# Patient Record
Sex: MALE | Race: BLACK OR AFRICAN AMERICAN | NOT HISPANIC OR LATINO | Employment: FULL TIME | URBAN - METROPOLITAN AREA
[De-identification: names, ages, dates, MRNs, and addresses within clinical notes are randomized per-mention and may not be internally consistent; named-entity substitution may affect disease eponyms.]

---

## 2020-06-09 ENCOUNTER — HOSPITAL ENCOUNTER (EMERGENCY)
Facility: HOSPITAL | Age: 55
Discharge: HOME | End: 2020-06-09
Attending: EMERGENCY MEDICINE
Payer: MEDICAID

## 2020-06-09 VITALS
OXYGEN SATURATION: 99 % | SYSTOLIC BLOOD PRESSURE: 130 MMHG | DIASTOLIC BLOOD PRESSURE: 69 MMHG | BODY MASS INDEX: 34.8 KG/M2 | WEIGHT: 235 LBS | TEMPERATURE: 98 F | HEART RATE: 59 BPM | RESPIRATION RATE: 14 BRPM | HEIGHT: 69 IN

## 2020-06-09 DIAGNOSIS — R10.9 FLANK PAIN: Primary | ICD-10-CM

## 2020-06-09 LAB
ALBUMIN SERPL-MCNC: 3.8 G/DL (ref 3.3–5.5)
ALP SERPL-CCNC: 56 U/L (ref 42–141)
BILIRUB SERPL-MCNC: 0.6 MG/DL (ref 0.2–1.6)
BILIRUBIN, POC UA: NEGATIVE
BLOOD, POC UA: NEGATIVE
BUN SERPL-MCNC: 22 MG/DL (ref 7–22)
CALCIUM SERPL-MCNC: 10 MG/DL (ref 8–10.3)
CHLORIDE SERPL-SCNC: 107 MMOL/L (ref 98–108)
CLARITY, POC UA: CLEAR
COLOR, POC UA: YELLOW
CREAT SERPL-MCNC: 1.3 MG/DL (ref 0.6–1.2)
GLUCOSE SERPL-MCNC: 99 MG/DL (ref 73–118)
GLUCOSE, POC UA: NEGATIVE
KETONES, POC UA: NEGATIVE
LEUKOCYTE EST, POC UA: NEGATIVE
NITRITE, POC UA: NEGATIVE
PH UR STRIP: 5.5 [PH]
POC ALT (SGPT): 17 U/L (ref 10–47)
POC AST (SGOT): 29 U/L (ref 11–38)
POC TCO2: 26 MMOL/L (ref 18–33)
POTASSIUM BLD-SCNC: 3.9 MMOL/L (ref 3.6–5.1)
PROTEIN, POC UA: NEGATIVE
PROTEIN, POC: 7.7 G/DL (ref 6.4–8.1)
SODIUM BLD-SCNC: 145 MMOL/L (ref 128–145)
SPECIFIC GRAVITY, POC UA: >=1.03
UROBILINOGEN, POC UA: 0.2 E.U./DL

## 2020-06-09 PROCEDURE — 96374 THER/PROPH/DIAG INJ IV PUSH: CPT | Mod: ER

## 2020-06-09 PROCEDURE — 63600175 PHARM REV CODE 636 W HCPCS: Mod: ER | Performed by: PHYSICIAN ASSISTANT

## 2020-06-09 PROCEDURE — 80053 COMPREHEN METABOLIC PANEL: CPT | Mod: ER

## 2020-06-09 PROCEDURE — 99284 EMERGENCY DEPT VISIT MOD MDM: CPT | Mod: 25,ER

## 2020-06-09 PROCEDURE — 81003 URINALYSIS AUTO W/O SCOPE: CPT | Mod: ER

## 2020-06-09 PROCEDURE — 85025 COMPLETE CBC W/AUTO DIFF WBC: CPT | Mod: ER

## 2020-06-09 RX ORDER — METHOCARBAMOL 500 MG/1
500 TABLET, FILM COATED ORAL 3 TIMES DAILY
Qty: 15 TABLET | Refills: 0 | Status: SHIPPED | OUTPATIENT
Start: 2020-06-09 | End: 2020-06-14

## 2020-06-09 RX ORDER — DEXTROMETHORPHAN HYDROBROMIDE, GUAIFENESIN 5; 100 MG/5ML; MG/5ML
650 LIQUID ORAL EVERY 8 HOURS PRN
Qty: 30 TABLET | Refills: 0 | Status: SHIPPED | OUTPATIENT
Start: 2020-06-09

## 2020-06-09 RX ORDER — KETOROLAC TROMETHAMINE 30 MG/ML
15 INJECTION, SOLUTION INTRAMUSCULAR; INTRAVENOUS
Status: COMPLETED | OUTPATIENT
Start: 2020-06-09 | End: 2020-06-09

## 2020-06-09 RX ADMIN — KETOROLAC TROMETHAMINE 15 MG: 30 INJECTION, SOLUTION INTRAMUSCULAR at 07:06

## 2020-06-10 NOTE — ED PROVIDER NOTES
Encounter Date: 6/9/2020    SCRIBE #1 NOTE: I, Shamar Cordon, am scribing for, and in the presence of,  MITCH Escobar. I have scribed the following portions of the note - Other sections scribed: HPI, ROS, PE.       History     Chief Complaint   Patient presents with    Flank Pain     R flank pain x 2 wks, getting worse, sharp, radiates to R side abd, denies N/V/D, worse w/ lying down, denies dysuria/hematuria     55 year old male with constant sharp right sided flank pain onset 1 month. Pain has gradually worsened since for 2 weeks and has not gotten better since. Pain radiates to lower right side of abdomen and towards his back. Exacerbated when laying down, but states pain eases up upon standing. Denies any dysuria, hematuria, fevers, vomiting, rashes, or numbness/weakness to lower extremities. Patient has not done any heavy lifting recently. Feels pain currently in the ED. Patient is not on blood thinners. Has not taken any medication for the pain. No known drug allergies.    The history is provided by the patient. No  was used.     Review of patient's allergies indicates:  No Known Allergies  Past Medical History:   Diagnosis Date    Gouty arthropathy, unspecified     Morbid obesity      No past surgical history on file.  Family History   Problem Relation Age of Onset    Hypertension Father     Hyperlipidemia Father      Social History     Tobacco Use    Smoking status: Current Some Day Smoker    Tobacco comment: cigars only   Substance Use Topics    Alcohol use: Yes     Comment: socially    Drug use: Not on file     Review of Systems   Constitutional: Negative for fever.   Gastrointestinal: Positive for abdominal pain. Negative for diarrhea, nausea and vomiting.   Genitourinary: Positive for flank pain. Negative for dysuria and hematuria.   Musculoskeletal: Positive for back pain. Negative for arthralgias.   Skin: Negative for rash.   Neurological: Negative for weakness and  numbness.   All other systems reviewed and are negative.      Physical Exam     Initial Vitals [06/09/20 1907]   BP Pulse Resp Temp SpO2   123/68 (!) 53 18 98.1 °F (36.7 °C) --      MAP       --         Physical Exam    Nursing note and vitals reviewed.  Constitutional: He appears well-developed and well-nourished. He is not diaphoretic. No distress.   HENT:   Head: Normocephalic and atraumatic.   Right Ear: External ear normal.   Left Ear: External ear normal.   Nose: Nose normal.   Eyes: Conjunctivae and EOM are normal. Pupils are equal, round, and reactive to light. No scleral icterus.   Neck: Normal range of motion. Neck supple.   Cardiovascular: Normal rate and regular rhythm.   Pulmonary/Chest: No respiratory distress.   Abdominal: Soft. He exhibits no distension. There is no tenderness. There is no rebound, no guarding and no CVA tenderness.   Musculoskeletal: Normal range of motion.   Neurological: He is alert and oriented to person, place, and time. GCS score is 15. GCS eye subscore is 4. GCS verbal subscore is 5. GCS motor subscore is 6.   Skin: Skin is warm and dry. No rash noted. No erythema.         ED Course   Procedures  Labs Reviewed   POCT URINALYSIS W/O SCOPE - Abnormal; Notable for the following components:       Result Value    Spec Grav UA >=1.030 (*)     All other components within normal limits   POCT CMP - Abnormal; Notable for the following components:    POC Creatinine 1.3 (*)     All other components within normal limits   POCT CBC   POCT URINALYSIS DIPSTICK (CULTURE IF INDICATED)   POCT CMP          Imaging Results          CT Renal Stone Study ABD Pelvis WO (Final result)  Result time 06/09/20 19:51:44    Final result by Bhavana Woodward MD (06/09/20 19:51:44)                 Impression:      1. Hepatomegaly  2. Findings suggesting previous gastric surgery  3. Mild degenerative changes of the spine and atherosclerotic plaque of the aorta and iliac arteries.      Electronically signed  by: Bhavana Woodward MD  Date:    06/09/2020  Time:    19:51             Narrative:    EXAMINATION:  CT RENAL STONE STUDY ABD PELVIS WO    CLINICAL HISTORY:  Flank pain, stone disease suspected;right flank;    TECHNIQUE:  Routine axial CT images of the abdomen and pelvis were obtained without contrast, which decreases sensitivity for detection of lesions of the solid organs, vascular structures, and bowel.   Coronal and Sagittal reformatted images were also obtained.    COMPARISON:  None    FINDINGS:  Heart: Normal in Size.  No pericardial effusion.    Lungs: Well aerated, without consolidation or pleural effusion.    Liver: Enlarged with a maximum sagittal diameter of 18.7 cm.  No focal lesions.    Gallbladder: No calcified gallstones.    Bile ducts: No evidence of dilated ducts.    Pancreas: No mass or peripancreatic fat stranding.    Spleen: Normal.    Adrenals: Normal.    GI Tract/ Mesentery: No evidence of bowel obstruction or inflammation. There is a curvilinear calcific density at the periphery of the stomach, suggesting previous gastric surgery.  The appendix is unremarkable.    Kidneys/ Ureters: Normal in size and location. No hydronephrosis or nephrolithiasis. No ureteral dilatation.    Bladder: No evidence of wall thickening.    Reproductive organs: Normal.    Retroperitoneum: No significant adenopathy.    Peritoneal space: No ascites. No free air.    Abdominal wall: Normal.    Vasculature: Minimal atherosclerotic plaque of the iliac arteries and aorta.  No aortic aneurysm.    Bones: No acute fracture. Minimal degenerative changes of the spine.                              X-Rays:   Independently Interpreted Readings:   Abdomen:   Renal Stone Study - No kidney stone or obstructive uropathy.  Appendix appears normal.     Medical Decision Making:   Clinical Tests:   Lab Tests: Ordered and Reviewed  Radiological Study: Ordered and Reviewed  ED Management:  55-year-old male with right flank pain x2 months.   No dysuria, fever, or vomiting.  No rash on exam.  He is nontender to the abdomen and flank.  UA without evidence of infection.  Labs without concerning abnormality.  CT without evidence of stone or other acute process.  Will treat with Tylenol and muscle relaxer and have patient follow up with PCP.  Resources were provided.            Scribe Attestation:   Scribe #1: I performed the above scribed service and the documentation accurately describes the services I performed. I attest to the accuracy of the note.    Jose Tapia                      Clinical Impression:     1. Flank pain                ED Disposition Condition    Discharge Stable        ED Prescriptions     Medication Sig Dispense Start Date End Date Auth. Provider    acetaminophen (TYLENOL) 650 MG TbSR Take 1 tablet (650 mg total) by mouth every 8 (eight) hours as needed. 30 tablet 6/9/2020  Jose Tapia PA-C    methocarbamoL (ROBAXIN) 500 MG Tab Take 1 tablet (500 mg total) by mouth 3 (three) times daily. for 5 days 15 tablet 6/9/2020 6/14/2020 Jose Tapia PA-C        Follow-up Information     Follow up With Specialties Details Why Contact Info    [facility] Emergency Medicine Go to  If symptoms worsen [address] [facility] [id number] [phone]    [facility]   To establish care [address] [facility] [address]  [phone]      [facility]  Schedule an appointment as soon as possible for a visit  To establish care [address] [facility] [id number] [phone]                                     Jose Tapia PA-C  06/09/20 2016

## 2023-07-08 ENCOUNTER — HOSPITAL ENCOUNTER (EMERGENCY)
Facility: HOSPITAL | Age: 58
Discharge: HOME | End: 2023-07-08
Attending: EMERGENCY MEDICINE
Payer: COMMERCIAL

## 2023-07-08 VITALS
TEMPERATURE: 98 F | RESPIRATION RATE: 18 BRPM | BODY MASS INDEX: 35.5 KG/M2 | HEIGHT: 70 IN | DIASTOLIC BLOOD PRESSURE: 70 MMHG | HEART RATE: 58 BPM | WEIGHT: 248 LBS | OXYGEN SATURATION: 98 % | SYSTOLIC BLOOD PRESSURE: 145 MMHG

## 2023-07-08 DIAGNOSIS — E86.0 DEHYDRATION: ICD-10-CM

## 2023-07-08 DIAGNOSIS — R42 DIZZINESS: ICD-10-CM

## 2023-07-08 DIAGNOSIS — R55 NEAR SYNCOPE: Primary | ICD-10-CM

## 2023-07-08 DIAGNOSIS — R00.1 BRADYCARDIA: ICD-10-CM

## 2023-07-08 LAB
ALBUMIN SERPL-MCNC: 3.6 G/DL (ref 3.3–5.5)
ALP SERPL-CCNC: 53 U/L (ref 42–141)
BILIRUB SERPL-MCNC: 0.5 MG/DL (ref 0.2–1.6)
BILIRUBIN, POC UA: NEGATIVE
BLOOD, POC UA: NEGATIVE
BUN SERPL-MCNC: 18 MG/DL (ref 7–22)
CALCIUM SERPL-MCNC: 9.7 MG/DL (ref 8–10.3)
CHLORIDE SERPL-SCNC: 109 MMOL/L (ref 98–108)
CLARITY, POC UA: CLEAR
COLOR, POC UA: YELLOW
CREAT SERPL-MCNC: 1.1 MG/DL (ref 0.6–1.2)
GLUCOSE SERPL-MCNC: 92 MG/DL (ref 73–118)
GLUCOSE, POC UA: NEGATIVE
KETONES, POC UA: NEGATIVE
LEUKOCYTE EST, POC UA: NEGATIVE
NITRITE, POC UA: NEGATIVE
PH UR STRIP: 5.5 [PH]
POC ALT (SGPT): 22 U/L (ref 10–47)
POC AST (SGOT): 25 U/L (ref 11–38)
POC B-TYPE NATRIURETIC PEPTIDE: 26 PG/ML (ref 0–100)
POC CARDIAC TROPONIN I: 0.01 NG/ML (ref 0–0.08)
POC TCO2: 28 MMOL/L (ref 18–33)
POCT GLUCOSE: 93 MG/DL (ref 70–110)
POTASSIUM BLD-SCNC: 4.3 MMOL/L (ref 3.6–5.1)
PROTEIN, POC UA: NEGATIVE
PROTEIN, POC: 6.7 G/DL (ref 6.4–8.1)
SAMPLE: NORMAL
SODIUM BLD-SCNC: 142 MMOL/L (ref 128–145)
SPECIFIC GRAVITY, POC UA: >=1.03
UROBILINOGEN, POC UA: 0.2 E.U./DL

## 2023-07-08 PROCEDURE — 93010 ELECTROCARDIOGRAM REPORT: CPT | Mod: ,,, | Performed by: INTERNAL MEDICINE

## 2023-07-08 PROCEDURE — 83880 ASSAY OF NATRIURETIC PEPTIDE: CPT | Mod: ER

## 2023-07-08 PROCEDURE — 96360 HYDRATION IV INFUSION INIT: CPT | Mod: ER

## 2023-07-08 PROCEDURE — 96361 HYDRATE IV INFUSION ADD-ON: CPT | Mod: ER

## 2023-07-08 PROCEDURE — 93010 EKG 12-LEAD: ICD-10-PCS | Mod: ,,, | Performed by: INTERNAL MEDICINE

## 2023-07-08 PROCEDURE — 93005 ELECTROCARDIOGRAM TRACING: CPT | Mod: ER

## 2023-07-08 PROCEDURE — 80053 COMPREHEN METABOLIC PANEL: CPT | Mod: ER

## 2023-07-08 PROCEDURE — 99284 EMERGENCY DEPT VISIT MOD MDM: CPT | Mod: 25,ER

## 2023-07-08 PROCEDURE — 85025 COMPLETE CBC W/AUTO DIFF WBC: CPT | Mod: ER

## 2023-07-08 PROCEDURE — 25000003 PHARM REV CODE 250: Mod: ER | Performed by: EMERGENCY MEDICINE

## 2023-07-08 PROCEDURE — 81003 URINALYSIS AUTO W/O SCOPE: CPT | Mod: ER

## 2023-07-08 PROCEDURE — 84484 ASSAY OF TROPONIN QUANT: CPT | Mod: ER

## 2023-07-08 PROCEDURE — 82962 GLUCOSE BLOOD TEST: CPT | Mod: ER

## 2023-07-08 RX ADMIN — SODIUM CHLORIDE 1000 ML: 9 INJECTION, SOLUTION INTRAVENOUS at 02:07

## 2023-07-08 NOTE — ED PROVIDER NOTES
Encounter Date: 7/8/2023    SCRIBE #1 NOTE: I, Divina Rivera, am scribing for, and in the presence of,  Josh Diego MD. I have scribed the following portions of the note - Other sections scribed: HPI; ROS; PE.     History     Chief Complaint   Patient presents with    Dizziness     Pt reports dizziness, near syncopal episode, and blurred vision 1 ago hour while walking in [facility].  Symptoms subsided, Pt currently feels weak.      Gilberto Martinez is a 58 y.o. male with no known medical history who presents with dizziness and fatigue onset 1.5 hours ago. Associated symptoms are blurry vision, nausea, and shortness of breath. Patient reports he was walking at [facility] when his symptoms began. He states he thinks he was a little sweaty. Patient notes he did have the urge to urinate after the symptoms had alleviated. He describes the dizziness as feeling like he was about to pass out. He states currently his only symptom is fatigue. Patient reports he did have a daiquiri last night. He states he works in the heat. Patient denies associated chest pain, cough, nausea, vomiting, diarrhea, decreased appetite, abdominal pain, facial droop, weakness, headache, lower extremity swelling, or frequency. No further complaints at this time. Pa    The history is provided by the patient. No  was used.   Review of patient's allergies indicates:  No Known Allergies  Past Medical History:   Diagnosis Date    Gouty arthropathy, unspecified     Morbid obesity      No past surgical history on file.  Family History   Problem Relation Age of Onset    Hypertension Father     Hyperlipidemia Father      Social History     Tobacco Use    Smoking status: Some Days    Tobacco comments:     cigars only   Substance Use Topics    Alcohol use: Yes     Comment: socially     Review of Systems   Constitutional:  Positive for fatigue. Negative for appetite change and fever.   HENT:  Negative for sore throat.          Negative for facial droop.   Eyes:  Positive for visual disturbance.   Respiratory:  Positive for shortness of breath. Negative for cough.    Cardiovascular:  Negative for chest pain.   Gastrointestinal:  Positive for nausea. Negative for abdominal pain.   Genitourinary:  Negative for dysuria.   Musculoskeletal:  Negative for back pain.   Skin:  Negative for rash.   Neurological:  Positive for dizziness. Negative for weakness.     Physical Exam     Initial Vitals [07/08/23 1310]   BP Pulse Resp Temp SpO2   (!) 164/85 (!) 56 16 97.9 °F (36.6 °C) 98 %      MAP       --         Physical Exam    Nursing note and vitals reviewed.  Constitutional: He appears well-developed and well-nourished.   HENT:   Head: Atraumatic.   Eyes: EOM are normal. Pupils are equal, round, and reactive to light.   Neck: Neck supple. No JVD present.   Normal range of motion.  Cardiovascular:  Normal rate, regular rhythm, normal heart sounds and intact distal pulses.     Exam reveals no gallop and no friction rub.       No murmur heard.  Pulmonary/Chest: Effort normal and breath sounds normal.   Abdominal: Abdomen is soft. Bowel sounds are normal.   Musculoskeletal:         General: Normal range of motion.      Cervical back: Normal range of motion and neck supple.      Right lower leg: Edema present.      Left lower leg: Edema present.     Lymphadenopathy:     He has no cervical adenopathy.   Neurological: He is alert and oriented to person, place, and time. He has normal strength.   Skin: Skin is warm and dry.   Psychiatric: He has a normal mood and affect. Thought content normal.       ED Course   Procedures  Labs Reviewed   POCT URINALYSIS W/O SCOPE - Abnormal; Notable for the following components:       Result Value    Spec Grav UA >=1.030 (*)     All other components within normal limits   POCT CMP - Abnormal; Notable for the following components:    POC Chloride 109 (*)     All other components within normal limits   TROPONIN ISTAT   POCT  CBC   POCT URINALYSIS W/O SCOPE   POCT GLUCOSE, HAND-HELD DEVICE   POCT CMP   POCT TROPONIN   POCT B-TYPE NATRIURETIC PEPTIDE (BNP)   POCT GLUCOSE   POCT B-TYPE NATRIURETIC PEPTIDE (BNP)     EKG Readings: (Independently Interpreted)   Initial Reading: No STEMI. Rhythm: Sinus Bradycardia. Heart Rate: 43. Ectopy: No Ectopy. Conduction: Normal. ST Segments: Normal ST Segments.   Low voltage     Imaging Results    None          Medications   sodium chloride 0.9% bolus 1,000 mL 1,000 mL (1,000 mLs Intravenous New Bag 7/8/23 1408)     Medical Decision Making:   History:   Old Medical Records: I decided to obtain old medical records.  Old Records Summarized: records from clinic visits and other records.       <> Summary of Records: External documents reviewed.   Initial Assessment:   This is an emergent evaluation of a 58 y.o. male who presents with dizziness and fatigue. The patient was seen and examined. The history and physical exam was obtained. The nursing notes and vital signs were reviewed. Secondary to symptoms and examination findings, I ordered CBC, CMP, Glucose, Urinalysis, Troponin, BNP, and EKG. Will treat with sodium chloride IV.   Independently Interpreted Test(s):   I have ordered and independently interpreted EKG Reading(s) - see prior notes  Clinical Tests:   Lab Tests: Ordered and Reviewed  Medical Tests: Ordered and Reviewed     PT had brief episode of feeling faint. No other symptoms. Asymptomatic since. Work up negative except urine concentrated. Pt states he works daily in the heat. Dehydration may be a factor. HR currently 58. HR was 43 sinus cristi on EKG. Possible symptomatic bradycardia. No CP or SOB. BNP and troponin negative. Will refer to cardiology.      Scribe Attestation:   Scribe #1: I performed the above scribed service and the documentation accurately describes the services I performed. I attest to the accuracy of the note.               I, Josh Diego, personally performed the  services described in this documentation. All medical record entries made by the scribe were at my direction and in my presence.  I have reviewed the chart and agree that the record reflects my personal performance and is accurate and complete      Clinical Impression:   Final diagnoses:  [R42] Dizziness  [R55] Near syncope (Primary)  [E86.0] Dehydration  [R00.1] Bradycardia        ED Disposition Condition    Discharge Stable          ED Prescriptions    None       Follow-up Information       Follow up With Specialties Details Why Contact Info    Kip Hurd MD Cardiology, Interventional Cardiology Schedule an appointment as soon as possible for a visit   [address]  [address]  [facility] [address] [phone]      [facility] ED Emergency Medicine  As needed, If symptoms return, worsen or new symptoms develop [address] [facility] [address] [phone]             Josh Diego MD  07/08/23 2486

## 2023-07-18 ENCOUNTER — OFFICE VISIT (OUTPATIENT)
Dept: CARDIOLOGY | Facility: CLINIC | Age: 58
End: 2023-07-18
Payer: COMMERCIAL

## 2023-07-18 VITALS
HEIGHT: 70 IN | OXYGEN SATURATION: 95 % | HEART RATE: 49 BPM | RESPIRATION RATE: 18 BRPM | SYSTOLIC BLOOD PRESSURE: 117 MMHG | DIASTOLIC BLOOD PRESSURE: 56 MMHG | BODY MASS INDEX: 36.7 KG/M2 | WEIGHT: 256.38 LBS

## 2023-07-18 DIAGNOSIS — R55 NEAR SYNCOPE: ICD-10-CM

## 2023-07-18 DIAGNOSIS — R00.1 BRADYCARDIA: ICD-10-CM

## 2023-07-18 PROCEDURE — 3008F PR BODY MASS INDEX (BMI) DOCUMENTED: ICD-10-PCS | Mod: CPTII,S$GLB,, | Performed by: INTERNAL MEDICINE

## 2023-07-18 PROCEDURE — 99204 PR OFFICE/OUTPT VISIT, NEW, LEVL IV, 45-59 MIN: ICD-10-PCS | Mod: S$GLB,,, | Performed by: INTERNAL MEDICINE

## 2023-07-18 PROCEDURE — 1160F RVW MEDS BY RX/DR IN RCRD: CPT | Mod: CPTII,S$GLB,, | Performed by: INTERNAL MEDICINE

## 2023-07-18 PROCEDURE — 99204 OFFICE O/P NEW MOD 45 MIN: CPT | Mod: S$GLB,,, | Performed by: INTERNAL MEDICINE

## 2023-07-18 PROCEDURE — 1160F PR REVIEW ALL MEDS BY PRESCRIBER/CLIN PHARMACIST DOCUMENTED: ICD-10-PCS | Mod: CPTII,S$GLB,, | Performed by: INTERNAL MEDICINE

## 2023-07-18 PROCEDURE — 3074F PR MOST RECENT SYSTOLIC BLOOD PRESSURE < 130 MM HG: ICD-10-PCS | Mod: CPTII,S$GLB,, | Performed by: INTERNAL MEDICINE

## 2023-07-18 PROCEDURE — 99999 PR PBB SHADOW E&M-EST. PATIENT-LVL IV: ICD-10-PCS | Mod: PBBFAC,,, | Performed by: INTERNAL MEDICINE

## 2023-07-18 PROCEDURE — 1159F MED LIST DOCD IN RCRD: CPT | Mod: CPTII,S$GLB,, | Performed by: INTERNAL MEDICINE

## 2023-07-18 PROCEDURE — 1159F PR MEDICATION LIST DOCUMENTED IN MEDICAL RECORD: ICD-10-PCS | Mod: CPTII,S$GLB,, | Performed by: INTERNAL MEDICINE

## 2023-07-18 PROCEDURE — 3078F DIAST BP <80 MM HG: CPT | Mod: CPTII,S$GLB,, | Performed by: INTERNAL MEDICINE

## 2023-07-18 PROCEDURE — 3074F SYST BP LT 130 MM HG: CPT | Mod: CPTII,S$GLB,, | Performed by: INTERNAL MEDICINE

## 2023-07-18 PROCEDURE — 3078F PR MOST RECENT DIASTOLIC BLOOD PRESSURE < 80 MM HG: ICD-10-PCS | Mod: CPTII,S$GLB,, | Performed by: INTERNAL MEDICINE

## 2023-07-18 PROCEDURE — 3008F BODY MASS INDEX DOCD: CPT | Mod: CPTII,S$GLB,, | Performed by: INTERNAL MEDICINE

## 2023-07-18 PROCEDURE — 93000 EKG 12-LEAD: ICD-10-PCS | Mod: S$GLB,,, | Performed by: INTERNAL MEDICINE

## 2023-07-18 PROCEDURE — 99999 PR PBB SHADOW E&M-EST. PATIENT-LVL IV: CPT | Mod: PBBFAC,,, | Performed by: INTERNAL MEDICINE

## 2023-07-18 PROCEDURE — 93000 ELECTROCARDIOGRAM COMPLETE: CPT | Mod: S$GLB,,, | Performed by: INTERNAL MEDICINE

## 2023-07-18 NOTE — PROGRESS NOTES
CARDIOVASCULAR CONSULTATION    REASON FOR CONSULT:   Gilberto Martinez is a 58 y.o. male who presents for evaluation    HISTORY OF PRESENT ILLNESS:       Patient is a pleasant 58-year-old man.  Here for evaluation.  Came to ER with near syncope dizziness and fatigue.  Found to have sinus bradycardia.  CBC and hemoglobin were fine.  CMP was fine.  Since then has not had any further episodes of near syncope or dizziness.  However continues to be bradycardic with heart rate around 49 beats per minute today in the clinic.  Denies orthopnea, PND, swelling of feet.  Denies any further episodes of syncope or near syncope.    PAST MEDICAL HISTORY:     Past Medical History:   Diagnosis Date    Gouty arthropathy, unspecified     Morbid obesity        PAST SURGICAL HISTORY:   No past surgical history on file.    ALLERGIES AND MEDICATION:   Review of patient's allergies indicates:  No Known Allergies     Medication List            Accurate as of July 18, 2023  3:20 PM. If you have any questions, ask your nurse or doctor.                CONTINUE taking these medications      acetaminophen 650 MG Tbsr  Commonly known as: TYLENOL  Take 1 tablet (650 mg total) by mouth every 8 (eight) hours as needed.     predniSONE 20 MG tablet  Commonly known as: DELTASONE  3 a day for 2 days, then 2 a day for 2 days then 1 qd              SOCIAL HISTORY:     Social History     Socioeconomic History    Marital status:    Tobacco Use    Smoking status: Some Days    Tobacco comments:     cigars only   Substance and Sexual Activity    Alcohol use: Yes     Comment: socially       FAMILY HISTORY:     Family History   Problem Relation Age of Onset    Hypertension Father     Hyperlipidemia Father        REVIEW OF SYSTEMS:   Review of Systems   Constitutional: Positive for malaise/fatigue.   HENT: Negative.     Eyes: Negative.    Cardiovascular:  Positive for near-syncope.   Respiratory: Negative.     Endocrine: Negative.   "  Hematologic/Lymphatic: Negative.    Skin: Negative.    Musculoskeletal: Negative.    Gastrointestinal: Negative.    Genitourinary: Negative.    Neurological: Negative.    Psychiatric/Behavioral: Negative.     Allergic/Immunologic: Negative.      A 10 point review of systems was performed and all the pertinent positives have been mentioned. Rest of review of systems was negative.        PHYSICAL EXAM:     Vitals:    07/18/23 1454   BP: (!) 117/56   Pulse: (!) 49   Resp: 18    Body mass index is 36.79 kg/m².  Weight: 116.3 kg (256 lb 6.3 oz)   Height: 5' 10" (177.8 cm)     Physical Exam  Vitals reviewed.   Constitutional:       Appearance: He is well-developed.   HENT:      Head: Normocephalic.   Eyes:      Conjunctiva/sclera: Conjunctivae normal.      Pupils: Pupils are equal, round, and reactive to light.   Cardiovascular:      Rate and Rhythm: Normal rate and regular rhythm.      Heart sounds: Normal heart sounds.   Pulmonary:      Effort: Pulmonary effort is normal.      Breath sounds: Normal breath sounds.   Abdominal:      General: Bowel sounds are normal.      Palpations: Abdomen is soft.   Musculoskeletal:      Cervical back: Normal range of motion and neck supple.   Skin:     General: Skin is warm.   Neurological:      Mental Status: He is alert and oriented to person, place, and time.         DATA:     Laboratory:  CBC:        CHEMISTRIES:        CARDIAC BIOMARKERS:        COAGS:        LIPIDS/LFTS:        Hemoglobin A1C   Date Value Ref Range Status   03/21/2009 6.2 4.0 - 6.2 % Final       TSH        The ASCVD Risk score (Kentrell DK, et al., 2019) failed to calculate for the following reasons:    Cannot find a previous HDL lab    Cannot find a previous total cholesterol lab    The smoking status is invalid       BNP    No results found for: BNP          ASSESSMENT AND PLAN     Patient Active Problem List   Diagnosis    Unspecified sleep apnea    Morbid obesity    Gouty arthropathy, unspecified     Near " syncope.  One episode.  No recurrence after that.  Was found to be seen is bradycardic.  Currently sinus bradycardic also.  Check stress echocardiogram to look for chronotropic competence     Check Holter     Check TSH     Follow-up after testing            Thank you very much for involving me in the care of your patient.  Please do not hesitate to contact me if there are any questions.      Kip Hurd MD, FACC, [facility]  Interventional Cardiologist, [facility].           This note was dictated with the help of speech recognition software.  There might be un-intended errors and/or substitutions.

## 2023-08-19 DIAGNOSIS — R55 NEAR SYNCOPE: Primary | ICD-10-CM

## 2023-08-23 ENCOUNTER — TELEPHONE (OUTPATIENT)
Dept: CARDIOLOGY | Facility: CLINIC | Age: 58
End: 2023-08-23
Payer: COMMERCIAL

## 2023-08-23 NOTE — TELEPHONE ENCOUNTER
----- Message from Kip Hurd MD sent at 8/19/2023  8:15 PM CDT -----  I have ordered regular stress test, echo and holer.  Schedule him for these tests.  [facility]    ----- Message -----  From: Nicolle Araujo MA  Sent: 8/17/2023  12:23 PM CDT  To: Kip Hurd MD    Need another order folter monitor for this patient. Patient wife states stress test  not covered by insurance.

## 2023-08-29 ENCOUNTER — HOSPITAL ENCOUNTER (OUTPATIENT)
Dept: CARDIOLOGY | Facility: HOSPITAL | Age: 58
Discharge: HOME | End: 2023-08-29
Attending: INTERNAL MEDICINE
Payer: COMMERCIAL

## 2023-08-29 VITALS — WEIGHT: 256 LBS | BODY MASS INDEX: 36.65 KG/M2 | HEIGHT: 70 IN

## 2023-08-29 DIAGNOSIS — R55 NEAR SYNCOPE: ICD-10-CM

## 2023-08-29 LAB
ASCENDING AORTA: 3.18 CM
AV INDEX (PROSTH): 0.77
AV MEAN GRADIENT: 6 MMHG
AV PEAK GRADIENT: 10 MMHG
AV VALVE AREA BY VELOCITY RATIO: 3.42 CM²
AV VALVE AREA: 3.24 CM²
AV VELOCITY RATIO: 0.81
BSA FOR ECHO PROCEDURE: 2.39 M2
CV ECHO LV RWT: 0.41 CM
CV STRESS BASE HR: 39 BPM
DIASTOLIC BLOOD PRESSURE: 82 MMHG
DOP CALC AO PEAK VEL: 1.62 M/S
DOP CALC AO VTI: 38.7 CM
DOP CALC LVOT AREA: 4.2 CM2
DOP CALC LVOT DIAMETER: 2.32 CM
DOP CALC LVOT PEAK VEL: 1.31 M/S
DOP CALC LVOT STROKE VOLUME: 125.49 CM3
DOP CALC MV VTI: 36.9 CM
DOP CALCLVOT PEAK VEL VTI: 29.7 CM
E WAVE DECELERATION TIME: 176.82 MSEC
E/A RATIO: 1.11
E/E' RATIO: 7 M/S
ECHO LV POSTERIOR WALL: 1 CM (ref 0.6–1.1)
FRACTIONAL SHORTENING: 39 % (ref 28–44)
INTERVENTRICULAR SEPTUM: 0.96 CM (ref 0.6–1.1)
IVC DIAMETER: 1.34 CM
LA MAJOR: 5.87 CM
LA MINOR: 6.33 CM
LEFT ATRIUM SIZE: 4.28 CM
LEFT INTERNAL DIMENSION IN SYSTOLE: 2.97 CM (ref 2.1–4)
LEFT VENTRICLE DIASTOLIC VOLUME INDEX: 47.46 ML/M2
LEFT VENTRICLE DIASTOLIC VOLUME: 110.1 ML
LEFT VENTRICLE MASS INDEX: 73 G/M2
LEFT VENTRICLE SYSTOLIC VOLUME INDEX: 14.8 ML/M2
LEFT VENTRICLE SYSTOLIC VOLUME: 34.29 ML
LEFT VENTRICULAR INTERNAL DIMENSION IN DIASTOLE: 4.85 CM (ref 3.5–6)
LEFT VENTRICULAR MASS: 168.45 G
LV LATERAL E/E' RATIO: 6.46 M/S
LV SEPTAL E/E' RATIO: 7.64 M/S
LVOT MG: 3.31 MMHG
LVOT MV: 0.84 CM/S
MV MEAN GRADIENT: 1 MMHG
MV PEAK A VEL: 0.76 M/S
MV PEAK E VEL: 0.84 M/S
MV PEAK GRADIENT: 5 MMHG
MV STENOSIS PRESSURE HALF TIME: 51.28 MS
MV VALVE AREA BY CONTINUITY EQUATION: 3.4 CM2
MV VALVE AREA P 1/2 METHOD: 4.29 CM2
OHS CV CPX 1 MINUTE RECOVERY HEART RATE: 107 BPM
OHS CV CPX 85 PERCENT MAX PREDICTED HEART RATE MALE: 137
OHS CV CPX ESTIMATED METS: 8
OHS CV CPX MAX PREDICTED HEART RATE: 161
OHS CV CPX PATIENT IS FEMALE: 0
OHS CV CPX PATIENT IS MALE: 1
OHS CV CPX PEAK DIASTOLIC BLOOD PRESSURE: 104 MMHG
OHS CV CPX PEAK HEAR RATE: 148 BPM
OHS CV CPX PEAK RATE PRESSURE PRODUCT: NORMAL
OHS CV CPX PEAK SYSTOLIC BLOOD PRESSURE: 214 MMHG
OHS CV CPX PERCENT MAX PREDICTED HEART RATE ACHIEVED: 92
OHS CV CPX RATE PRESSURE PRODUCT PRESENTING: 5499
PISA TR MAX VEL: 2.49 M/S
PULM VEIN S/D RATIO: 0.8
PV PEAK D VEL: 0.51 M/S
PV PEAK GRADIENT: 3 MMHG
PV PEAK S VEL: 0.41 M/S
PV PEAK VELOCITY: 0.91 M/S
RA MAJOR: 5.8 CM
RA PRESSURE ESTIMATED: 3 MMHG
RA WIDTH: 4.5 CM
RIGHT VENTRICULAR END-DIASTOLIC DIMENSION: 5.1 CM
RV TB RVSP: 5 MMHG
RV TISSUE DOPPLER FREE WALL SYSTOLIC VELOCITY 1 (APICAL 4 CHAMBER VIEW): 14.96 CM/S
SINUS: 2.46 CM
STJ: 2.14 CM
STRESS ECHO POST EXERCISE DUR MIN: 6 MINUTES
STRESS ECHO POST EXERCISE DUR SEC: 50 SECONDS
STRESS ST DEPRESSION: 1 MM
SYSTOLIC BLOOD PRESSURE: 141 MMHG
TDI LATERAL: 0.13 M/S
TDI SEPTAL: 0.11 M/S
TDI: 0.12 M/S
TR MAX PG: 25 MMHG
TRICUSPID ANNULAR PLANE SYSTOLIC EXCURSION: 1.84 CM
TV REST PULMONARY ARTERY PRESSURE: 28 MMHG
Z-SCORE OF LEFT VENTRICULAR DIMENSION IN END DIASTOLE: -6.47
Z-SCORE OF LEFT VENTRICULAR DIMENSION IN END SYSTOLE: -4.98

## 2023-08-29 PROCEDURE — 93016 CV STRESS TEST SUPVJ ONLY: CPT | Mod: ,,, | Performed by: INTERNAL MEDICINE

## 2023-08-29 PROCEDURE — 93018 EXERCISE STRESS - EKG (CUPID ONLY): ICD-10-PCS | Mod: ,,, | Performed by: INTERNAL MEDICINE

## 2023-08-29 PROCEDURE — 93016 EXERCISE STRESS - EKG (CUPID ONLY): ICD-10-PCS | Mod: ,,, | Performed by: INTERNAL MEDICINE

## 2023-08-29 PROCEDURE — 93018 CV STRESS TEST I&R ONLY: CPT | Mod: ,,, | Performed by: INTERNAL MEDICINE

## 2023-08-29 PROCEDURE — 93306 TTE W/DOPPLER COMPLETE: CPT

## 2023-08-29 PROCEDURE — 93017 CV STRESS TEST TRACING ONLY: CPT

## 2023-08-29 PROCEDURE — 93306 ECHO (CUPID ONLY): ICD-10-PCS | Mod: 26,,, | Performed by: INTERNAL MEDICINE

## 2023-08-29 PROCEDURE — 93227 XTRNL ECG REC<48 HR R&I: CPT | Mod: ,,, | Performed by: INTERNAL MEDICINE

## 2023-08-29 PROCEDURE — 93226 XTRNL ECG REC<48 HR SCAN A/R: CPT

## 2023-08-29 PROCEDURE — 93306 TTE W/DOPPLER COMPLETE: CPT | Mod: 26,,, | Performed by: INTERNAL MEDICINE

## 2023-08-29 PROCEDURE — 93227 HOLTER MONITOR - 48 HOUR (CUPID ONLY): ICD-10-PCS | Mod: ,,, | Performed by: INTERNAL MEDICINE

## 2023-09-07 LAB
OHS CV EVENT MONITOR DAY: 2
OHS CV HOLTER LENGTH DECIMAL HOURS: 96
OHS CV HOLTER LENGTH HOURS: 48
OHS CV HOLTER LENGTH MINUTES: 0
OHS CV HOLTER SINUS AVERAGE HR: 55
OHS CV HOLTER SINUS MAX HR: 125
OHS CV HOLTER SINUS MIN HR: 34

## 2023-09-11 ENCOUNTER — OFFICE VISIT (OUTPATIENT)
Dept: CARDIOLOGY | Facility: CLINIC | Age: 58
End: 2023-09-11
Payer: COMMERCIAL

## 2023-09-11 VITALS
SYSTOLIC BLOOD PRESSURE: 118 MMHG | HEIGHT: 70 IN | OXYGEN SATURATION: 97 % | DIASTOLIC BLOOD PRESSURE: 62 MMHG | BODY MASS INDEX: 37.63 KG/M2 | RESPIRATION RATE: 18 BRPM | HEART RATE: 45 BPM | WEIGHT: 262.88 LBS

## 2023-09-11 DIAGNOSIS — R55 NEAR SYNCOPE: Primary | ICD-10-CM

## 2023-09-11 DIAGNOSIS — R00.1 BRADYCARDIA: ICD-10-CM

## 2023-09-11 DIAGNOSIS — E66.01 MORBID OBESITY: ICD-10-CM

## 2023-09-11 PROCEDURE — 3008F BODY MASS INDEX DOCD: CPT | Mod: CPTII,S$GLB,, | Performed by: INTERNAL MEDICINE

## 2023-09-11 PROCEDURE — 3008F PR BODY MASS INDEX (BMI) DOCUMENTED: ICD-10-PCS | Mod: CPTII,S$GLB,, | Performed by: INTERNAL MEDICINE

## 2023-09-11 PROCEDURE — 3078F DIAST BP <80 MM HG: CPT | Mod: CPTII,S$GLB,, | Performed by: INTERNAL MEDICINE

## 2023-09-11 PROCEDURE — 1160F RVW MEDS BY RX/DR IN RCRD: CPT | Mod: CPTII,S$GLB,, | Performed by: INTERNAL MEDICINE

## 2023-09-11 PROCEDURE — 1159F MED LIST DOCD IN RCRD: CPT | Mod: CPTII,S$GLB,, | Performed by: INTERNAL MEDICINE

## 2023-09-11 PROCEDURE — 3074F PR MOST RECENT SYSTOLIC BLOOD PRESSURE < 130 MM HG: ICD-10-PCS | Mod: CPTII,S$GLB,, | Performed by: INTERNAL MEDICINE

## 2023-09-11 PROCEDURE — 3074F SYST BP LT 130 MM HG: CPT | Mod: CPTII,S$GLB,, | Performed by: INTERNAL MEDICINE

## 2023-09-11 PROCEDURE — 1160F PR REVIEW ALL MEDS BY PRESCRIBER/CLIN PHARMACIST DOCUMENTED: ICD-10-PCS | Mod: CPTII,S$GLB,, | Performed by: INTERNAL MEDICINE

## 2023-09-11 PROCEDURE — 99999 PR PBB SHADOW E&M-EST. PATIENT-LVL IV: ICD-10-PCS | Mod: PBBFAC,,, | Performed by: INTERNAL MEDICINE

## 2023-09-11 PROCEDURE — 99214 PR OFFICE/OUTPT VISIT, EST, LEVL IV, 30-39 MIN: ICD-10-PCS | Mod: S$GLB,,, | Performed by: INTERNAL MEDICINE

## 2023-09-11 PROCEDURE — 1159F PR MEDICATION LIST DOCUMENTED IN MEDICAL RECORD: ICD-10-PCS | Mod: CPTII,S$GLB,, | Performed by: INTERNAL MEDICINE

## 2023-09-11 PROCEDURE — 3078F PR MOST RECENT DIASTOLIC BLOOD PRESSURE < 80 MM HG: ICD-10-PCS | Mod: CPTII,S$GLB,, | Performed by: INTERNAL MEDICINE

## 2023-09-11 PROCEDURE — 99999 PR PBB SHADOW E&M-EST. PATIENT-LVL IV: CPT | Mod: PBBFAC,,, | Performed by: INTERNAL MEDICINE

## 2023-09-11 PROCEDURE — 99214 OFFICE O/P EST MOD 30 MIN: CPT | Mod: S$GLB,,, | Performed by: INTERNAL MEDICINE

## 2023-09-11 NOTE — PROGRESS NOTES
CARDIOVASCULAR CONSULTATION    REASON FOR CONSULT:   Gilberto Martinez is a 58 y.o. male who presents for evaluation    HISTORY OF PRESENT ILLNESS:       Patient is a pleasant 58-year-old man.  Here for evaluation.  Came to ER with near syncope dizziness and fatigue.  Found to have sinus bradycardia.  CBC and hemoglobin were fine.  CMP was fine.  Since then has not had any further episodes of near syncope or dizziness.  However continues to be bradycardic with heart rate around 49 beats per minute today in the clinic.  Denies orthopnea, PND, swelling of feet.  Denies any further episodes of syncope or near syncope.            The patient exercised for 6 minutes 50 seconds on a Adolfo protocol, corresponding to a functional capacity of 8 METS, achieving a peak heart rate of 148 bpm, which is 92 % of the age predicted maximum heart rate. The patient experienced no angina during the test. Their exercise capacity was normal.    The ECG portion of the study is suspicious for ischemia.  1mm upsloping ST depression.  Low voltage.    The patient reported no chest pain during the stress test.    During stress, frequent PVCs are noted.    The exercise capacity was normal.         Sinus rhythm Heart rates varied between 34 and 125 BPM with an average of 55 BPM.    There were very rare PVCs totalling 23 and averaging 0.24 per hour.    There were very rare PACs totalling 220 and averaging 2.29 per hour.         Left Ventricle: The left ventricle is normal in size. Normal wall thickness. Normal wall motion. There is normal systolic function with a visually estimated ejection fraction of 55 - 60%. There is diastolic dysfunction.    Right Ventricle: Moderate right ventricular enlargement. Wall thickness is normal. Right ventricle wall motion  is normal. Systolic function is borderline low.    Pulmonary Artery: The estimated pulmonary artery       Notes from September 2023: Patient here for follow-up.  Doing fine.  No further  anginal episodes.  Personally reviewed EKG stress test, no significant ischemia seen.  Denies orthopnea, PND, swelling of feet      PAST MEDICAL HISTORY:     Past Medical History:   Diagnosis Date    Gouty arthropathy, unspecified     Morbid obesity        PAST SURGICAL HISTORY:   No past surgical history on file.    ALLERGIES AND MEDICATION:   Review of patient's allergies indicates:  No Known Allergies     Medication List            Accurate as of September 11, 2023  8:52 AM. If you have any questions, ask your nurse or doctor.                CONTINUE taking these medications      acetaminophen 650 MG Tbsr  Commonly known as: TYLENOL  Take 1 tablet (650 mg total) by mouth every 8 (eight) hours as needed.     predniSONE 20 MG tablet  Commonly known as: DELTASONE  3 a day for 2 days, then 2 a day for 2 days then 1 qd              SOCIAL HISTORY:     Social History     Socioeconomic History    Marital status:    Tobacco Use    Smoking status: Some Days    Tobacco comments:     cigars only   Substance and Sexual Activity    Alcohol use: Yes     Comment: socially       FAMILY HISTORY:     Family History   Problem Relation Age of Onset    Hypertension Father     Hyperlipidemia Father        REVIEW OF SYSTEMS:   Review of Systems   Constitutional: Positive for malaise/fatigue.   HENT: Negative.     Eyes: Negative.    Cardiovascular:  Positive for near-syncope.   Respiratory: Negative.     Endocrine: Negative.    Hematologic/Lymphatic: Negative.    Skin: Negative.    Musculoskeletal: Negative.    Gastrointestinal: Negative.    Genitourinary: Negative.    Neurological: Negative.    Psychiatric/Behavioral: Negative.     Allergic/Immunologic: Negative.        A 10 point review of systems was performed and all the pertinent positives have been mentioned. Rest of review of systems was negative.        PHYSICAL EXAM:     Vitals:    09/11/23 0822   BP: 118/62   Pulse: (!) 45   Resp: 18    Body mass index is 37.72  "kg/m².  Weight: 119.3 kg (262 lb 14.4 oz)   Height: 5' 10" (177.8 cm)     Physical Exam  Vitals reviewed.   Constitutional:       Appearance: He is well-developed.   HENT:      Head: Normocephalic.   Eyes:      Conjunctiva/sclera: Conjunctivae normal.      Pupils: Pupils are equal, round, and reactive to light.   Cardiovascular:      Rate and Rhythm: Normal rate and regular rhythm.      Heart sounds: Normal heart sounds.   Pulmonary:      Effort: Pulmonary effort is normal.      Breath sounds: Normal breath sounds.   Abdominal:      General: Bowel sounds are normal.      Palpations: Abdomen is soft.   Musculoskeletal:      Cervical back: Normal range of motion and neck supple.   Skin:     General: Skin is warm.   Neurological:      Mental Status: He is alert and oriented to person, place, and time.           DATA:     Laboratory:  CBC:        CHEMISTRIES:        CARDIAC BIOMARKERS:        COAGS:        LIPIDS/LFTS:        Hemoglobin A1C   Date Value Ref Range Status   03/21/2009 6.2 4.0 - 6.2 % Final       TSH        The ASCVD Risk score (Kentrell GUERIN, et al., 2019) failed to calculate for the following reasons:    Cannot find a previous HDL lab    Cannot find a previous total cholesterol lab       BNP    No results found for: "BNP"          ASSESSMENT AND PLAN     Patient Active Problem List   Diagnosis    Unspecified sleep apnea    Morbid obesity    Gouty arthropathy, unspecified     Near syncope.  One episode.  No recurrence after that.  Was found to be seen is bradycardic.  Currently sinus bradycardic also.  EKG stress test showed good chronotropic competence.  Never had chest pains.  No significant ischemia seen.  It is read as suspicious for ischemia, but on my personal review it is difficult to state that is it is severe ischemia based on wandering baseline.  I discussed doing further testing with the patient versus medical management.  Since the patient is asymptomatic, he would like to continue medical " management.  Avoid AV brittany blocking agents.    Check TSH and follow-up with primary care physician regarding the    Follow-up after6 m            Thank you very much for involving me in the care of your patient.  Please do not hesitate to contact me if there are any questions.      Kip Hurd MD, FACC, [facility]  Interventional Cardiologist, [facility].           This note was dictated with the help of speech recognition software.  There might be un-intended errors and/or substitutions.

## 2025-07-09 ENCOUNTER — HOSPITAL ENCOUNTER (EMERGENCY)
Facility: OTHER | Age: 60
Discharge: HOME | End: 2025-07-09
Attending: EMERGENCY MEDICINE
Payer: COMMERCIAL

## 2025-07-09 VITALS
RESPIRATION RATE: 16 BRPM | OXYGEN SATURATION: 98 % | DIASTOLIC BLOOD PRESSURE: 74 MMHG | SYSTOLIC BLOOD PRESSURE: 130 MMHG | HEIGHT: 70 IN | BODY MASS INDEX: 35.5 KG/M2 | WEIGHT: 248 LBS | TEMPERATURE: 98 F | HEART RATE: 52 BPM

## 2025-07-09 DIAGNOSIS — S01.111A LACERATION OF RIGHT EYEBROW, INITIAL ENCOUNTER: Primary | ICD-10-CM

## 2025-07-09 PROCEDURE — 90715 TDAP VACCINE 7 YRS/> IM: CPT | Performed by: EMERGENCY MEDICINE

## 2025-07-09 PROCEDURE — 25000003 PHARM REV CODE 250: Performed by: EMERGENCY MEDICINE

## 2025-07-09 PROCEDURE — 12013 RPR F/E/E/N/L/M 2.6-5.0 CM: CPT

## 2025-07-09 PROCEDURE — 96372 THER/PROPH/DIAG INJ SC/IM: CPT | Performed by: EMERGENCY MEDICINE

## 2025-07-09 PROCEDURE — 99283 EMERGENCY DEPT VISIT LOW MDM: CPT | Mod: 25

## 2025-07-09 PROCEDURE — 90471 IMMUNIZATION ADMIN: CPT | Performed by: EMERGENCY MEDICINE

## 2025-07-09 PROCEDURE — 63600175 PHARM REV CODE 636 W HCPCS: Performed by: EMERGENCY MEDICINE

## 2025-07-09 RX ORDER — IBUPROFEN 600 MG/1
600 TABLET, FILM COATED ORAL
Status: COMPLETED | OUTPATIENT
Start: 2025-07-09 | End: 2025-07-09

## 2025-07-09 RX ORDER — LIDOCAINE HYDROCHLORIDE 10 MG/ML
10 INJECTION, SOLUTION INFILTRATION; PERINEURAL
Status: COMPLETED | OUTPATIENT
Start: 2025-07-09 | End: 2025-07-09

## 2025-07-09 RX ADMIN — CLOSTRIDIUM TETANI TOXOID ANTIGEN (FORMALDEHYDE INACTIVATED), CORYNEBACTERIUM DIPHTHERIAE TOXOID ANTIGEN (FORMALDEHYDE INACTIVATED), BORDETELLA PERTUSSIS TOXOID ANTIGEN (GLUTARALDEHYDE INACTIVATED), BORDETELLA PERTUSSIS FILAMENTOUS HEMAGGLUTININ ANTIGEN (FORMALDEHYDE INACTIVATED), BORDETELLA PERTUSSIS PERTACTIN ANTIGEN, AND BORDETELLA PERTUSSIS FIMBRIAE 2/3 ANTIGEN 0.5 ML: 5; 2; 2.5; 5; 3; 5 INJECTION, SUSPENSION INTRAMUSCULAR at 04:07

## 2025-07-09 RX ADMIN — BACITRACIN ZINC, NEOMYCIN, POLYMYXIN B 1 EACH: 400; 3.5; 5 OINTMENT TOPICAL at 05:07

## 2025-07-09 RX ADMIN — LIDOCAINE HYDROCHLORIDE 10 ML: 10 INJECTION, SOLUTION INFILTRATION; PERINEURAL at 04:07

## 2025-07-09 RX ADMIN — IBUPROFEN 600 MG: 600 TABLET ORAL at 04:07

## 2025-07-09 NOTE — ED PROVIDER NOTES
Encounter Date: 7/9/2025    SCRIBE #1 NOTE: I, Edwina kohli, am scribing for, and in the presence of,  Alfredo Carroll MD. I have scribed the following portions of the note - Other sections scribed: hpi, ros,pe.       History     Chief Complaint   Patient presents with    Facial Laceration     Lac to R eyebrow from pulling dumpster at work. Bleeding controlled. Unsure if utd on vaccines.      This is a 60 y.o. male who presents with complaint of right forehead laceration that occurred a few hours ago. 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 He denies LOC, neck pain, or headache.  No other injuries or complaints.  He is unsure about his last tetanus shot  Patient denies any other complaints at this time.        Review of patient's allergies indicates:  No Known Allergies  Past Medical History:   Diagnosis Date    Gouty arthropathy, unspecified     Morbid obesity      History reviewed. No pertinent surgical history.  Family History   Problem Relation Name Age of Onset    Hypertension Father      Hyperlipidemia Father       Social History[1]  Review of Systems   Constitutional:  Negative for fever.   HENT:  Negative for congestion.    Eyes:  Negative for redness.   Respiratory:  Negative for shortness of breath.    Cardiovascular:  Negative for chest pain.   Gastrointestinal:  Negative for abdominal pain.   Genitourinary:  Negative for dysuria.   Musculoskeletal:  Negative for neck pain.   Skin:  Positive for wound. Negative for rash.   Neurological:  Negative for syncope and headaches.   Psychiatric/Behavioral:  Negative for confusion.        Physical Exam     Initial Vitals [07/09/25 0246]   BP Pulse Resp Temp SpO2   (!) 142/79 (!) 58 16 98.5 °F (36.9 °C) 98 %      MAP       --         Physical Exam    Nursing note and vitals reviewed.  Constitutional: He appears well-developed and  well-nourished. He is not diaphoretic. No distress.   HENT:   Head: Normocephalic.   4 cm horizontally oriented linear laceration through medial right eyebrow with mild soft tissue edema surrounding, no active bleeding.  No scalp hematoma/ecchymosis or eye injury   Eyes: Conjunctivae are normal. No scleral icterus.   Neck: Neck supple.   Cardiovascular:  Normal rate, regular rhythm, normal heart sounds and intact distal pulses.           No murmur heard.  Pulmonary/Chest: Breath sounds normal. No respiratory distress. He has no wheezes. He has no rhonchi. He has no rales.   Musculoskeletal:         General: No edema.      Cervical back: Neck supple.     Neurological: He is alert and oriented to person, place, and time. He has normal strength.   Skin: Skin is warm and dry.   Psychiatric: He has a normal mood and affect.         ED Course   Lac Repair    Date/Time: 7/9/2025 7:20 AM    Performed by: Alfredo Carroll MD  Authorized by: Alfredo Carroll MD    Consent:     Consent obtained:  Verbal    Consent given by:  Patient    Risks, benefits, and alternatives were discussed: yes      Risks discussed:  Infection, poor cosmetic result and nerve damage  Anesthesia:     Anesthesia method:  Local infiltration    Local anesthetic:  Lidocaine 1% w/o epi  Laceration details:     Location:  Face    Face location:  R eyebrow    Length (cm):  4    Depth (mm):  8  Pre-procedure details:     Preparation:  Patient was prepped and draped in usual sterile fashion  Exploration:     Limited defect created (wound extended): no      Wound exploration: wound explored through full range of motion and entire depth of wound visualized      Wound extent: no foreign bodies/material noted, no underlying fracture noted and no vascular damage noted      Contaminated: no    Treatment:     Amount of cleaning:  Standard    Irrigation solution:  Sterile saline    Irrigation method:  Pressure wash    Visualized foreign bodies/material  removed: no      Debridement:  None    Undermining:  None    Scar revision: no    Skin repair:     Repair method:  Sutures    Suture size:  5-0    Suture material:  Nylon    Suture technique:  Simple interrupted  Approximation:     Approximation:  Close  Repair type:     Repair type:  Simple  Post-procedure details:     Dressing:  Antibiotic ointment    Procedure completion:  Tolerated well, no immediate complications    Labs Reviewed - No data to display       Imaging Results    None          Medications   LIDOcaine HCL 10 mg/ml (1%) injection 10 mL (10 mLs Infiltration Given by Provider 7/9/25 1575)   ibuprofen tablet 600 mg (600 mg Oral Given 7/9/25 5325)   Tdap vaccine injection 0.5 mL (0.5 mLs Intramuscular Given 7/9/25 9997)   neomycin-bacitracnZn-polymyxnB packet 1 each (1 each Topical (Top) Given 7/9/25 1154)     Medical Decision Making      60-year-old male with no comorbidities presents with right facial laceration.  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 he denies any LOC or significant headache, no other injuries or complaints.  On arrival patient resting comfortably with no scalp ecchymosis/hematoma.  He does have 5 cm linear laceration horizontally oriented through medial right eyebrow with mild adjacent soft tissue edema, no significant active bleeding.  No clinical sign of intracranial hemorrhage or facial fracture, no indication for emergent imaging.  Patient does not remember his tetanus status, will update now and plan on suture repair for laceration.    Patient tolerated laceration repair without complication with good cosmetic result after 7 sutures placed.  He is advised on further wound care and return precautions for any signs of infection, and follow up in 5-7 days for suture removal.        Amount and/or Complexity of Data Reviewed  External Data  Reviewed: notes.    Risk  OTC drugs.  Prescription drug management.            Scribe Attestation:   Scribe #1: I performed the above scribed service and the documentation accurately describes the services I performed. I attest to the accuracy of the note.              I, Dr. Alfredo Carroll, personally performed the services described in this documentation. All medical record entries made by the scribe were at my direction and in my presence.  I have reviewed the chart and agree that the record reflects my personal performance and is accurate and complete. Alfredo Carroll MD.                      Clinical Impression:  Final diagnoses:  [S01.111A] Laceration of right eyebrow, initial encounter (Primary)          ED Disposition Condition    Discharge Stable          ED Prescriptions    None       Follow-up Information       Follow up With Specialties Details Why Contact Info    [facility] Dept Emergency Medicine Go to  [facility] [address] [facility] [address]  [phone]          Launch MDCalc MDM  MDCalc MDM Module  Jul 09 2025 8:37 AM [Alfredo Carroll]  Problems: Facial laceration (low)  Additional encounter diagnoses: Laceration of right eyebrow, initial encounter  Risk: ibuprofen tablet (Rx drug management), laceration repair (Decision regarding minor surgery with patient or procedure risk factors)             [1]   Social History  Tobacco Use    Smoking status: Some Days    Tobacco comments:     cigars only   Substance Use Topics    Alcohol use: Yes     Comment: socially    Drug use: Never        Alfredo Carroll MD  07/09/25 0888

## 2025-07-16 ENCOUNTER — HOSPITAL ENCOUNTER (EMERGENCY)
Facility: OTHER | Age: 60
Discharge: HOME | End: 2025-07-16
Attending: EMERGENCY MEDICINE
Payer: COMMERCIAL

## 2025-07-16 VITALS
SYSTOLIC BLOOD PRESSURE: 149 MMHG | DIASTOLIC BLOOD PRESSURE: 83 MMHG | HEART RATE: 54 BPM | TEMPERATURE: 98 F | OXYGEN SATURATION: 100 % | RESPIRATION RATE: 18 BRPM

## 2025-07-16 DIAGNOSIS — Z48.02 VISIT FOR SUTURE REMOVAL: Primary | ICD-10-CM

## 2025-07-16 PROCEDURE — 99999 HC NO LEVEL OF SERVICE - ED ONLY: CPT

## 2025-07-16 NOTE — ED PROVIDER NOTES
Encounter Date: 7/16/2025       History     Chief Complaint   Patient presents with    Suture / Staple Removal     7/9 right eyebrow sutures placed. Here for removal. Well approximated. No S/Sx of infection     Patient is a 60-year-old male who had sutures placed in this facility on July 9, 2025.  They were placed to the right eyebrow.  He is here for suture removal.  Denies pain redness warmth swelling or fever.    The history is provided by the patient. No  was used.     Review of patient's allergies indicates:  No Known Allergies  Past Medical History:   Diagnosis Date    Gouty arthropathy, unspecified     Morbid obesity      History reviewed. No pertinent surgical history.  Family History   Problem Relation Name Age of Onset    Hypertension Father      Hyperlipidemia Father       Social History[1]  Review of Systems   Skin:  Positive for wound.       Physical Exam     Initial Vitals [07/16/25 1031]   BP Pulse Resp Temp SpO2   (!) 149/83 (!) 51 18 98 °F (36.7 °C) 100 %      MAP       --         Physical Exam    Nursing note and vitals reviewed.  Constitutional: He appears well-developed and well-nourished. He is not diaphoretic. No distress.   HENT:   Head: Normocephalic and atraumatic.   Right Ear: External ear normal.   Left Ear: External ear normal.   Nose: Nose normal.   Eyes: Pupils are equal, round, and reactive to light. Right eye exhibits no discharge. Left eye exhibits no discharge. No scleral icterus.   Neck:   Normal range of motion.  Pulmonary/Chest: No respiratory distress.   Abdominal: He exhibits no distension.   Musculoskeletal:         General: Normal range of motion.      Cervical back: Normal range of motion.     Neurological: He is alert and oriented to person, place, and time.   Skin: Skin is dry. Capillary refill takes less than 2 seconds.              ED Course   Suture Removal    Date/Time: 7/16/2025 3:26 PM  Location procedure was performed: [facility] EMERGENCY  DEPARTMENT    Performed by: Jonatan Miller DNP  Authorized by: Tommy Craig MD  Body area: head/neck  Location details: right eyebrow  Wound Appearance: clean, nonpurulent, well healed, nontender and no drainage  Sutures Removed: 9  Staples Removed: 0  Complications: No  Specimens: No  Implants: No  Patient tolerance: Patient tolerated the procedure well with no immediate complications        Labs Reviewed - No data to display       Imaging Results    None          Medications - No data to display  Medical Decision Making  Patient is a 60-year-old male who had sutures placed in this facility on July 9, 2025.  They were placed to the right eyebrow.  He is here for suture removal.  Denies pain redness warmth swelling or fever.    9 nylon sutures with no redness warmth or exudate to the right brow    Ddx: suture removal, laceration, infection    Problems Addressed:  Visit for suture removal: acute illness or injury     Details: Sutures removed per procedure note.    Amount and/or Complexity of Data Reviewed  Discussion of management or test interpretation with external provider(s): Vital signs at the time of disposition were:  BP (!) 149/83   Pulse (!) 54   Temp 98 °F (36.7 °C) (Oral)   Resp 18   SpO2 100%       See AVS for additional recommendations. Medications listed herein were prescribed after reviewing the patient's allergies, medication list, history, most recent laboratories as available.  Referrals below were provided after reviewing the patient's previous medical providers. He understands he  should return for any worsening or changes in condition.  Prior to discharge the patient was asked if he  had any additional concerns or complaints and he declined. The patient was given an opportunity to ask questions and all were answered to his satisfaction.     Risk  Diagnosis or treatment significantly limited by social determinants of health.                                          Clinical  Impression:  Final diagnoses:  [Z48.02] Visit for suture removal (Primary)          ED Disposition Condition    Discharge Stable          ED Prescriptions    None       Follow-up Information       Follow up With Specialties Details Why Contact Info    [facility]  Schedule an appointment as soon as possible for a visit   [address] [facility] [address]  [phone]            Launch MDCalc MDM  MDCalc MDM Module  Jul 16 2025 3:27 PM [Jonatan Miller]  Problems: visit for suture removal (low)  Additional encounter diagnoses: Visit for suture removal  Risk: suture removal (Decision regarding minor surgery)           [1]   Social History  Tobacco Use    Smoking status: Some Days    Tobacco comments:     cigars only   Vaping Use    Vaping status: Every Day   Substance Use Topics    Alcohol use: Yes     Comment: socially    Drug use: Never        Jonatan Miller, [facility]  07/16/25 1528

## 2025-07-17 ENCOUNTER — OFFICE VISIT (OUTPATIENT)
Dept: INTERNAL MEDICINE | Facility: CLINIC | Age: 60
End: 2025-07-17
Payer: COMMERCIAL

## 2025-07-17 ENCOUNTER — TELEPHONE (OUTPATIENT)
Dept: INTERNAL MEDICINE | Facility: CLINIC | Age: 60
End: 2025-07-17

## 2025-07-17 ENCOUNTER — HOSPITAL ENCOUNTER (OUTPATIENT)
Dept: CARDIOLOGY | Facility: OTHER | Age: 60
Discharge: HOME | End: 2025-07-17
Attending: FAMILY MEDICINE
Payer: COMMERCIAL

## 2025-07-17 VITALS
OXYGEN SATURATION: 98 % | HEIGHT: 70 IN | WEIGHT: 249.31 LBS | DIASTOLIC BLOOD PRESSURE: 70 MMHG | SYSTOLIC BLOOD PRESSURE: 125 MMHG | BODY MASS INDEX: 35.69 KG/M2 | HEART RATE: 58 BPM

## 2025-07-17 DIAGNOSIS — Z01.818 PRE-OPERATIVE EXAMINATION: ICD-10-CM

## 2025-07-17 DIAGNOSIS — E66.812 CLASS 2 OBESITY WITH BODY MASS INDEX (BMI) OF 35.0 TO 35.9 IN ADULT, UNSPECIFIED OBESITY TYPE, UNSPECIFIED WHETHER SERIOUS COMORBIDITY PRESENT: ICD-10-CM

## 2025-07-17 DIAGNOSIS — G47.30 SLEEP APNEA, UNSPECIFIED TYPE: ICD-10-CM

## 2025-07-17 DIAGNOSIS — Z01.818 PRE-OPERATIVE EXAMINATION: Primary | ICD-10-CM

## 2025-07-17 LAB
OHS QRS DURATION: 80 MS
OHS QTC CALCULATION: 371 MS

## 2025-07-17 PROCEDURE — 99999 PR PBB SHADOW E&M-EST. PATIENT-LVL IV: CPT | Mod: PBBFAC,,, | Performed by: FAMILY MEDICINE

## 2025-07-17 PROCEDURE — 93005 ELECTROCARDIOGRAM TRACING: CPT

## 2025-07-17 NOTE — PROGRESS NOTES
Subjective:      Patient ID: Gilberto aMrtinez is a 60 y.o. male.    Chief Complaint: Establish Care (Paperwork for Eye Surgery Sleep Disorder  )      History of Present Illness    CHIEF COMPLAINT:  - Mr. Martinez presents for medical clearance for upcoming cataract surgery on his right eye.    HPI:  Mr. Martinez is scheduled for cataract extraction on his right eye on August 13th. He has a history of childhood right eye injury from scissors, requiring sutures. He expresses interest in cataract surgery for his left eye as well, noting blurriness. He denies current chest pain or dyspnea during daily activities, and reports no difficulty with household ambulation or grocery shopping. He mentions being able to move a heavy dumpster without issues. He currently works as a  for the [employer] truck. He has a history of sleep apnea, diagnosed approximately in 2012. CPAP therapy was recommended but not initiated; he was lost to follow up given insurance issues. He underwent gastric sleeve surgery about 4-5 years ago, resulting in a weight loss of about 100 lbs, which he has maintained. He asks about further weight loss help.    He denies nausea, vomiting, urinary difficulties, non-healing skin lesions, severe headaches, and diagnoses of diabetes or hypertension.    MEDICAL HISTORY:  - Sleep apnea: Noted in 2012 records, patient believes he has it but has not received treatment  - Gout: Past history noted    SURGICAL HISTORY:  - Gastric sleeve surgery: 4-5 years ago, for weight loss. Mr. Martinez lost about 100 lbs.  - Right eye surgery: Childhood, sutures after scissors injury  - Cataract extraction: Scheduled for August 13th, right eye    FAMILY HISTORY:  - Father: Hypertension, possibly hypercholesterolemia (patient uncertain)  - Mother: Alzheimer's disease, possibly diabetes (patient uncertain)  - Sister: Hypertension    SOCIAL HISTORY:  - Alcohol: Consumes a couple beers weekly, occasionally hard  "alcohol  Smoking: Denies cigarette use, occasionally smokes cigars  Denies illicit drug use  - Occupation:   Marital status:          Problem List[1]     Current Medications[2]  Review of patient's allergies indicates:  No Known Allergies    Past Surgical History:   Procedure Laterality Date    gastric sleeve          Family History   Problem Relation Name Age of Onset    Alzheimer's disease Mother      Hypertension Father      Hypertension Sister          Social History[3]     Lab Results   Component Value Date     03/21/2009    K 3.8 03/21/2009     03/21/2009    CO2 25 03/21/2009     03/21/2009    BUN 19 03/21/2009    CREATININE 1.3 03/21/2009    CALCIUM 10.2 03/21/2009    PROT 7.8 03/21/2009    ALBUMIN 4.9 03/21/2009    BILITOT 0.2 03/21/2009    ALKPHOS 68 03/21/2009    AST 18 03/21/2009    ALT 22 03/21/2009    WBC 7.22 03/21/2009    HGB 14.5 03/21/2009    HGB 14.3 03/26/2007    HCT 45.3 03/21/2009    MCV 89.2 03/21/2009     03/21/2009    TSH 4.92 (H) 03/21/2009    PSA 0.34 03/21/2009    PSA 0.4 03/15/2006       Lab Results   Component Value Date    HGBA1C 6.2 03/21/2009     No results found for: "MICALBCREAT"  Lab Results   Component Value Date    LDLCALC 155.0 (H) 03/21/2009    LDLCALC 154.8 (H) 03/15/2006    CHOL 236 (H) 03/21/2009    HDL 39 (L) 03/21/2009    TRIG 210 (H) 03/21/2009       Review of Systems     Vitals:    07/17/25 0919 07/17/25 0940   BP: (!) 140/80 125/70   Pulse: (!) 58    SpO2: 98%    Weight: 113.1 kg (249 lb 5.4 oz)    Height: 5' 10" (1.778 m)    PainSc: 0-No pain      Objective:   Physical Exam    General: Pleasant. No acute distress. Well-developed. Well-nourished.  Eyes: EOMBI. Sclerae anicteric.  HENT: Normocephalic. Atraumatic. Nares patent. Moist oral mucosa.  Cardiovascular: Regular rate and rhythm. No murmurs. No gallops. No rubs. Normal S1 and S2.  Respiratory: Normal respiratory effort. Clear to auscultation bilaterally. No rales. No " rhonchi. No wheezing.  Abdomen: Soft. Nontender. Nondistended. Normoactive bowel sounds.  Musculoskeletal: No obvious deformity. Normal range of motion.  Extremities: No lower extremity edema.  Neurological: Alert and lucid. Normal gait. No gross deficits.  Psychiatric: Normal mood. Normal affect. Good insight. Good judgment.  Skin: Warm. Intact.       Assessment/Plan       ICD-10-CM ICD-9-CM   1. Pre-operative examination  Z01.818 V72.84   2. Sleep apnea, unspecified type  G47.30 780.57   3. Class 2 obesity with body mass index (BMI) of 35.0 to 35.9 in adult, unspecified obesity type, unspecified whether serious comorbidity present  E66.812 278.00    Z68.35 V85.35        Assessment & Plan     Recommend sleep study and treatment for suspected sleep apnea due to potential long-term cardiovascular and pulmonary complications if left untreated.   Considered weight loss medication options, pending review of lab results to determine appropriate choices and insurance coverage.       Pre-operative examination  Comments:  Pt in good functional status. Obtain labs as noted.  Orders:  -     Hemoglobin A1C; Future  -     CBC Auto Differential; Future  -     Comprehensive Metabolic Panel; Future  -     Lipid Panel; Future  -     T4, Free; Future  -     TSH; Future  -     SCHEDULED EKG 12-LEAD (to Muse); Future    Sleep apnea, unspecified type  Comments:  Refer to sleep clinic to re-evaluate and treat.  Orders:  -     Ambulatory referral/consult to Sleep Disorders; Future; Expected date: 07/24/2025    Class 2 obesity with body mass index (BMI) of 35.0 to 35.9 in adult, unspecified obesity type, unspecified whether serious comorbidity present  Comments:  Continue healthy diet and regular exercise.           Chronic conditions status updated as per HPI.  Other than changes above, continue current medications and maintain follow up with specialists.      Follow up in about 2 months (around 9/17/2025) for f/u weight.         Rena DIANA  MD [place]  [facility]    Pt has no active cardiac condition (ACS/USA, decompenstated CHF, significant arrhythmias or severe valvular disease) and can easily achieve 4 METS.  Pt does not require any further workup prior to cataract surgery. These recommendations follow the most current Guideline on Perioperative Cardiovascular Evaluation and Management of Patients Undergoing Noncardiac Surgery released by the ACC/AHA. (JACC 2014.07.944).    Calculated risks by RCRI criteria.     [email or web address]     This note was generated with the assistance of ambient listening technology. Verbal consent was obtained by the patient and accompanying visitor(s) for the recording of patient appointment to facilitate this note. I attest to having reviewed and edited the generated note for accuracy, though some syntax or spelling errors may persist. Please contact the author of this note for any clarification.       There are no Patient Instructions on file for this visit.  Tests to Keep You Healthy    Colon Cancer Screening: DUE      Immunization History   Administered Date(s) Administered    Tdap 07/09/2025                                     [1]   Patient Active Problem List  Diagnosis    Unspecified sleep apnea    Morbid obesity    Gouty arthropathy, unspecified   [2] No current outpatient medications on file.  [3]   Social History  Socioeconomic History    Marital status:    Occupational History    Occupation:    Tobacco Use    Smoking status: Never     Passive exposure: Never    Smokeless tobacco: Never    Tobacco comments:     cigars only   Vaping Use    Vaping status: Every Day   Substance and Sexual Activity    Alcohol use: Yes     Comment: 2 beers a week    Drug use: Never

## 2025-07-17 NOTE — TELEPHONE ENCOUNTER
----- Message from Rena Alvarado MD sent at 7/17/2025  1:48 PM CDT -----  Please call and notify patient:    Cholesterol panel improved. Triglycerides a little elevated but it may be due to non-fasting labs (recently had breakfast before labs).    A1c is normal at 5.6 (decreased from previous 6.2 which was 16 years ago).  Hemoglobin A1c is a measure of your blood sugars over the past 90 days and is a marker of diabetes. Normal A1c is anything below 5.7. A1c between 5.7 and 6.4 is at risk for diabetes. A1c 6.5 or   higher is considered diabetes.   ----- Message -----  From: Lab, Background User  Sent: 7/17/2025  10:45 AM CDT  To: Rena Alvarado MD